# Patient Record
Sex: MALE | Race: ASIAN | Employment: UNEMPLOYED | URBAN - METROPOLITAN AREA
[De-identification: names, ages, dates, MRNs, and addresses within clinical notes are randomized per-mention and may not be internally consistent; named-entity substitution may affect disease eponyms.]

---

## 2022-02-23 ENCOUNTER — OFFICE VISIT (OUTPATIENT)
Dept: INTERNAL MEDICINE CLINIC | Age: 37
End: 2022-02-23

## 2022-02-23 VITALS
SYSTOLIC BLOOD PRESSURE: 140 MMHG | WEIGHT: 229.4 LBS | OXYGEN SATURATION: 95 % | RESPIRATION RATE: 14 BRPM | HEART RATE: 63 BPM | BODY MASS INDEX: 34.77 KG/M2 | TEMPERATURE: 98.1 F | HEIGHT: 68 IN | DIASTOLIC BLOOD PRESSURE: 88 MMHG

## 2022-02-23 DIAGNOSIS — Z11.59 NEED FOR HEPATITIS C SCREENING TEST: ICD-10-CM

## 2022-02-23 DIAGNOSIS — Z78.9 VEGETARIAN DIET: ICD-10-CM

## 2022-02-23 DIAGNOSIS — R03.0 ELEVATED BP WITHOUT DIAGNOSIS OF HYPERTENSION: ICD-10-CM

## 2022-02-23 DIAGNOSIS — E80.6 HYPERBILIRUBINEMIA: ICD-10-CM

## 2022-02-23 DIAGNOSIS — F41.9 ANXIETY: ICD-10-CM

## 2022-02-23 DIAGNOSIS — Z00.00 ROUTINE ADULT HEALTH MAINTENANCE: ICD-10-CM

## 2022-02-23 DIAGNOSIS — E78.5 HYPERLIPIDEMIA, UNSPECIFIED HYPERLIPIDEMIA TYPE: Primary | ICD-10-CM

## 2022-02-23 DIAGNOSIS — J30.9 ALLERGIC RHINITIS, UNSPECIFIED SEASONALITY, UNSPECIFIED TRIGGER: ICD-10-CM

## 2022-02-23 PROCEDURE — 99204 OFFICE O/P NEW MOD 45 MIN: CPT | Performed by: INTERNAL MEDICINE

## 2022-02-23 RX ORDER — ATORVASTATIN CALCIUM 20 MG/1
TABLET, FILM COATED ORAL DAILY
COMMUNITY

## 2022-02-23 RX ORDER — BUSPIRONE HYDROCHLORIDE 10 MG/1
10 TABLET ORAL 2 TIMES DAILY
COMMUNITY
End: 2022-10-14 | Stop reason: SDUPTHER

## 2022-02-23 RX ORDER — CITALOPRAM 20 MG/1
TABLET, FILM COATED ORAL DAILY
COMMUNITY
End: 2022-10-14 | Stop reason: SDUPTHER

## 2022-02-23 RX ORDER — ZINC GLUCONATE 10 MG
LOZENGE ORAL
COMMUNITY

## 2022-02-23 RX ORDER — BISMUTH SUBSALICYLATE 262 MG
1 TABLET,CHEWABLE ORAL DAILY
COMMUNITY

## 2022-02-23 RX ORDER — MELATONIN
DAILY
COMMUNITY

## 2022-02-23 RX ORDER — CETIRIZINE HCL 10 MG
TABLET ORAL
COMMUNITY

## 2022-02-23 NOTE — PROGRESS NOTES
Assessment and Plan     1. Hyperlipidemia, unspecified hyperlipidemia type  Assessment & Plan:   unclear control, continue current medications pending work up below   atorva 20  Orders:  -     CBC W/O DIFF; Future  -     METABOLIC PANEL, COMPREHENSIVE; Future  -     HEMOGLOBIN A1C WITH EAG; Future  -     LIPID PANEL; Future  2. Need for hepatitis C screening test  -     HEPATITIS C AB; Future  3. Vegetarian diet  -     VITAMIN B12; Future  4. Anxiety  Assessment & Plan:   well controlled, continue current medications   buspar 10 BID   Citalopram 20 daily   Started maybe around 3 years   5. Allergic rhinitis, unspecified seasonality, unspecified trigger  Assessment & Plan:   well controlled, continue current medications   Seasonal, uses zyrtec  6. Routine adult health maintenance  Assessment & Plan:  Became a vegetarian a couple months ago  Has been working on diet  Due for covid booster  Encourage healthy habits  7. Elevated BP without diagnosis of hypertension  Assessment & Plan:  Blood pressure elevated on exam today. Recommend ambulatory checks. If persistently 140/90 or greater, advised patient to let me know so we can start medications. Discussed lifestyle changes       Benefits, risks, possible drug interactions, and side effects of all new medications were reviewed with the patient. Pt verbalized understanding. 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 Amaryllis Prader, MD  [facility]  2/23/2022    No future appointments. History of Present Illness   Chief Complaint   estab care    Ramakrishna Velasco is a 29 y.o. male         Review of Systems   Constitutional: Negative for chills and fever. HENT: Negative for hearing loss. Eyes: Negative for blurred vision. Respiratory: Negative for shortness of breath.     Cardiovascular: Negative for chest pain. Gastrointestinal: Negative for abdominal pain, blood in stool, constipation, diarrhea, melena, nausea and vomiting. Genitourinary: Negative for dysuria and hematuria. Musculoskeletal: Negative for joint pain. Skin: Negative for rash. Neurological: Negative for headaches. Past Medical History   No Known Allergies     Current Outpatient Medications   Medication Sig    atorvastatin (LIPITOR) 20 mg tablet Take  by mouth daily.  citalopram (CELEXA) 20 mg tablet Take  by mouth daily.  busPIRone (BUSPAR) 10 mg tablet Take 10 mg by mouth two (2) times a day.  multivitamin (ONE A DAY) tablet Take 1 Tablet by mouth daily.  cholecalciferol (VITAMIN D3) (1000 Units /25 mcg) tablet Take  by mouth daily.  magnesium 250 mg tab Take  by mouth. otc    cetirizine (ZYRTEC) 10 mg tablet Take  by mouth daily as needed. No current facility-administered medications for this visit. Patient Active Problem List   Diagnosis Code    Hyperlipidemia, unspecified hyperlipidemia type E78.5    Anxiety F41.9    Allergic rhinitis J30.9    Routine adult health maintenance Z00.00    Elevated BP without diagnosis of hypertension R03.0     Past Surgical History:   Procedure Laterality Date    HX CHOLECYSTECTOMY        Social History     Tobacco Use    Smoking status: Former Smoker    Smokeless tobacco: Never Used    Tobacco comment: 1-2 packs a week, quit ~2020, 13-14y pack year history   Substance Use Topics    Alcohol use: Never      Family History   Problem Relation Age of Onset    Cancer Father 72        colon    Hypertension Other     Heart Attack Neg Hx     Stroke Neg Hx     Diabetes Neg Hx         Physical Exam   Vitals:       Visit Vitals  BP (!) 140/88   Pulse 63   Temp 98.1 °F (36.7 °C) (Oral)   Resp 14   Ht 5' 8\" (1.727 m)   Wt 229 lb 6.4 oz (104.1 kg)   SpO2 95%   BMI 34.88 kg/m²        Physical Exam  Constitutional:       General: He is not in acute distress. Appearance: He is well-developed. HENT:      Right Ear: External ear normal. There is impacted cerumen. Left Ear: External ear normal. There is impacted cerumen. Eyes:      Extraocular Movements: Extraocular movements intact. Conjunctiva/sclera: Conjunctivae normal.   Cardiovascular:      Rate and Rhythm: Normal rate and regular rhythm. Pulses: Normal pulses. Heart sounds: No murmur heard. No friction rub. No gallop. Pulmonary:      Effort: No respiratory distress. Breath sounds: No wheezing, rhonchi or rales. Abdominal:      General: Bowel sounds are normal. There is no distension. Palpations: Abdomen is soft. There is no hepatomegaly, splenomegaly or mass. Tenderness: There is no abdominal tenderness. There is no guarding. Musculoskeletal:      Cervical back: Neck supple. Right lower leg: No edema. Left lower leg: No edema. Lymphadenopathy:      Cervical: No cervical adenopathy. Skin:     General: Skin is warm. Findings: No rash. Neurological:      Mental Status: He is alert.

## 2022-02-23 NOTE — ASSESSMENT & PLAN NOTE
Blood pressure elevated on exam today. Recommend ambulatory checks. If persistently 140/90 or greater, advised patient to let me know so we can start medications.   Discussed lifestyle changes

## 2022-02-23 NOTE — ASSESSMENT & PLAN NOTE
well controlled, continue current medications   buspar 10 BID   Citalopram 20 daily   Started maybe around 3 years

## 2022-02-23 NOTE — ASSESSMENT & PLAN NOTE
Became a vegetarian a couple months ago  Has been working on diet  Due for covid booster  Encourage healthy habits

## 2022-02-23 NOTE — PATIENT INSTRUCTIONS
Monitor your blood pressure at home 2-3 times a week. If persistently 140/90 or greater (either number), then please let us know.

## 2022-02-24 LAB
ALBUMIN SERPL-MCNC: 4.4 G/DL (ref 3.5–5)
ALBUMIN/GLOB SERPL: 1.6 {RATIO} (ref 1.1–2.2)
ALP SERPL-CCNC: 48 U/L (ref 45–117)
ALT SERPL-CCNC: 50 U/L (ref 12–78)
ANION GAP SERPL CALC-SCNC: 2 MMOL/L (ref 5–15)
AST SERPL-CCNC: 27 U/L (ref 15–37)
BILIRUB SERPL-MCNC: 2.2 MG/DL (ref 0.2–1)
BUN SERPL-MCNC: 15 MG/DL (ref 6–20)
BUN/CREAT SERPL: 20 (ref 12–20)
CALCIUM SERPL-MCNC: 9.1 MG/DL (ref 8.5–10.1)
CHLORIDE SERPL-SCNC: 106 MMOL/L (ref 97–108)
CHOLEST SERPL-MCNC: 222 MG/DL
CO2 SERPL-SCNC: 28 MMOL/L (ref 21–32)
CREAT SERPL-MCNC: 0.76 MG/DL (ref 0.7–1.3)
ERYTHROCYTE [DISTWIDTH] IN BLOOD BY AUTOMATED COUNT: 11.2 % (ref 11.5–14.5)
EST. AVERAGE GLUCOSE BLD GHB EST-MCNC: 103 MG/DL
GLOBULIN SER CALC-MCNC: 2.8 G/DL (ref 2–4)
GLUCOSE SERPL-MCNC: 99 MG/DL (ref 65–100)
HBA1C MFR BLD: 5.2 % (ref 4–5.6)
HCT VFR BLD AUTO: 44.9 % (ref 36.6–50.3)
HCV AB SERPL QL IA: NONREACTIVE
HDLC SERPL-MCNC: 55 MG/DL
HDLC SERPL: 4 {RATIO} (ref 0–5)
HGB BLD-MCNC: 13.8 G/DL (ref 12.1–17)
LDLC SERPL CALC-MCNC: 97.6 MG/DL (ref 0–100)
MCH RBC QN AUTO: 30.3 PG (ref 26–34)
MCHC RBC AUTO-ENTMCNC: 30.7 G/DL (ref 30–36.5)
MCV RBC AUTO: 98.5 FL (ref 80–99)
NRBC # BLD: 0 K/UL (ref 0–0.01)
NRBC BLD-RTO: 0 PER 100 WBC
PLATELET # BLD AUTO: 223 K/UL (ref 150–400)
PMV BLD AUTO: 11.4 FL (ref 8.9–12.9)
POTASSIUM SERPL-SCNC: 4.2 MMOL/L (ref 3.5–5.1)
PROT SERPL-MCNC: 7.2 G/DL (ref 6.4–8.2)
RBC # BLD AUTO: 4.56 M/UL (ref 4.1–5.7)
SODIUM SERPL-SCNC: 136 MMOL/L (ref 136–145)
TRIGL SERPL-MCNC: 347 MG/DL (ref ?–150)
VIT B12 SERPL-MCNC: 1530 PG/ML (ref 193–986)
VLDLC SERPL CALC-MCNC: 69.4 MG/DL
WBC # BLD AUTO: 7.3 K/UL (ref 4.1–11.1)

## 2022-02-25 LAB — BILIRUB DIRECT SERPL-MCNC: 0.4 MG/DL (ref 0–0.2)

## 2022-03-02 NOTE — PROGRESS NOTES
Please call the pt -bilirubin level is higher than normal.  I recommend that he follow-up in a month to get this rechecked and to talk about his cholesterol. His triglyceride level is high.  ======  B12 1530. Hep C negative. Triglyceride 347. LDL 97. A1c normal.  Total bili 2.2. Direct bili 0.4. ALT 50, AST 27. Alk phos 48. CBC normal.    Consider medication for triglyceride.   Unconjugated hyperbilirubinemia -plan for repeat  ALT elevated-repeat

## 2022-03-03 ENCOUNTER — TELEPHONE (OUTPATIENT)
Dept: INTERNAL MEDICINE CLINIC | Age: 37
End: 2022-03-03

## 2022-03-03 NOTE — TELEPHONE ENCOUNTER
----- Message from Carlie Malone MD sent at 0/6/6981  1:50 PM EST -----  Please call the pt -bilirubin level is higher than normal.  I recommend that he follow-up in a month to get this rechecked and to talk about his cholesterol. His triglyceride level is high.  ======  B12 1530. Hep C negative. Triglyceride 347. LDL 97. A1c normal.  Total bili 2.2. Direct bili 0.4. ALT 50, AST 27. Alk phos 48. CBC normal.    Consider medication for triglyceride.   Unconjugated hyperbilirubinemia -plan for repeat  ALT elevated-repeat

## 2022-03-18 PROBLEM — Z00.00 ROUTINE ADULT HEALTH MAINTENANCE: Status: ACTIVE | Noted: 2022-02-23

## 2022-03-19 PROBLEM — J30.9 ALLERGIC RHINITIS: Status: ACTIVE | Noted: 2022-02-23

## 2022-03-19 PROBLEM — R03.0 ELEVATED BP WITHOUT DIAGNOSIS OF HYPERTENSION: Status: ACTIVE | Noted: 2022-02-23

## 2022-03-19 PROBLEM — E78.5 HYPERLIPIDEMIA, UNSPECIFIED HYPERLIPIDEMIA TYPE: Status: ACTIVE | Noted: 2022-02-23

## 2022-03-20 PROBLEM — F41.9 ANXIETY: Status: ACTIVE | Noted: 2022-02-23

## 2022-03-25 PROBLEM — Z00.00 ROUTINE ADULT HEALTH MAINTENANCE: Status: RESOLVED | Noted: 2022-02-23 | Resolved: 2022-03-25

## 2022-04-08 ENCOUNTER — TELEPHONE (OUTPATIENT)
Dept: INTERNAL MEDICINE CLINIC | Age: 37
End: 2022-04-08

## 2022-04-08 NOTE — TELEPHONE ENCOUNTER
----- Message from Boone Severs sent at 4/8/2022  1:32 PM EDT -----  Subject: Message to Provider    QUESTIONS  Information for Provider? pt. wanted to inform of appointment cancellation   reason being due to concern of another covid wave happening and wanting to   hold off on non-essential appointments at this time  ---------------------------------------------------------------------------  --------------  [address]  What is the best way for the office to contact you? OK to leave message on   voicemail  Preferred Call Back Phone Number? [phone]  ---------------------------------------------------------------------------  --------------  SCRIPT ANSWERS  Relationship to Patient?  Self

## 2022-10-14 RX ORDER — BUSPIRONE HYDROCHLORIDE 10 MG/1
10 TABLET ORAL 2 TIMES DAILY
Qty: 60 TABLET | Refills: 0 | Status: SHIPPED | OUTPATIENT
Start: 2022-10-14 | End: 2022-11-10 | Stop reason: SDUPTHER

## 2022-10-14 RX ORDER — CITALOPRAM 20 MG/1
20 TABLET, FILM COATED ORAL DAILY
Qty: 30 TABLET | Refills: 0 | Status: SHIPPED | OUTPATIENT
Start: 2022-10-14

## 2022-11-10 RX ORDER — BUSPIRONE HYDROCHLORIDE 10 MG/1
10 TABLET ORAL 2 TIMES DAILY
Qty: 180 TABLET | Refills: 1 | Status: SHIPPED | OUTPATIENT
Start: 2022-11-10

## 2022-11-18 NOTE — TELEPHONE ENCOUNTER
Patient called to inform us Pharmacy would not refill without appointment - we did schedule for first available 12/28 at 1045 AM

## 2022-11-19 RX ORDER — ATORVASTATIN CALCIUM 20 MG/1
20 TABLET, FILM COATED ORAL DAILY
Qty: 90 TABLET | Refills: 1 | Status: SHIPPED | OUTPATIENT
Start: 2022-11-19

## 2022-12-09 RX ORDER — CITALOPRAM 20 MG/1
20 TABLET, FILM COATED ORAL DAILY
Qty: 30 TABLET | Refills: 0 | Status: SHIPPED | OUTPATIENT
Start: 2022-12-09

## 2022-12-12 RX ORDER — CITALOPRAM 20 MG/1
20 TABLET, FILM COATED ORAL DAILY
Qty: 30 TABLET | Refills: 0 | Status: SHIPPED | OUTPATIENT
Start: 2022-12-12

## 2022-12-28 ENCOUNTER — OFFICE VISIT (OUTPATIENT)
Dept: INTERNAL MEDICINE CLINIC | Age: 37
End: 2022-12-28
Payer: COMMERCIAL

## 2022-12-28 VITALS
HEIGHT: 68 IN | BODY MASS INDEX: 34.07 KG/M2 | HEART RATE: 67 BPM | RESPIRATION RATE: 14 BRPM | SYSTOLIC BLOOD PRESSURE: 119 MMHG | WEIGHT: 224.8 LBS | OXYGEN SATURATION: 98 % | DIASTOLIC BLOOD PRESSURE: 77 MMHG | TEMPERATURE: 98 F

## 2022-12-28 DIAGNOSIS — E78.5 HYPERLIPIDEMIA, UNSPECIFIED HYPERLIPIDEMIA TYPE: ICD-10-CM

## 2022-12-28 DIAGNOSIS — R79.89 ELEVATED LFTS: Primary | ICD-10-CM

## 2022-12-28 DIAGNOSIS — E80.6 HYPERBILIRUBINEMIA: ICD-10-CM

## 2022-12-28 DIAGNOSIS — F41.9 ANXIETY: ICD-10-CM

## 2022-12-28 PROCEDURE — 99214 OFFICE O/P EST MOD 30 MIN: CPT | Performed by: INTERNAL MEDICINE

## 2022-12-28 RX ORDER — CITALOPRAM 20 MG/1
20 TABLET, FILM COATED ORAL DAILY
Qty: 90 TABLET | Refills: 3 | Status: SHIPPED | OUTPATIENT
Start: 2022-12-28

## 2022-12-28 NOTE — ASSESSMENT & PLAN NOTE
Labs in February noted total bilirubin 2.2, direct bili 0.5, ALT 50, AST 27, alk phos 48  Recommend repeat with LFTs work-up

## 2022-12-28 NOTE — ASSESSMENT & PLAN NOTE
borderline controlled, continue current medications   BuSpar 10 twice a day, Celexa 20  Borderline controlled, patient does not want to make any changes today

## 2022-12-28 NOTE — PROGRESS NOTES
Note   Chief Complaint   Follow up    Carole Sumner is a 40 y.o. male     1. Elevated LFTs  Assessment & Plan:   Labs in February noted total bilirubin 2.2, direct bili 0.5, ALT 50, AST 27, alk phos 48  Recommend repeat with LFTs work-up  Orders:  -     HEPATITIS C AB; Future  -     FERRITIN; Future  -     IRON PROFILE; Future  -     ALPHA-1-ANTITRYPSIN, TOTAL; Future  -     HBV CORE AB, IGG/IGM; Future  -     HEP B SURFACE AG; Future  2. Hyperbilirubinemia  -     METABOLIC PANEL, COMPREHENSIVE; Future  -     BILIRUBIN, DIRECT; Future  -     LD; Future  -     HAPTOGLOBIN; Future  -     PATHOLOGIST REVIEW SMEARS; Future  3. Hyperlipidemia, unspecified hyperlipidemia type  Assessment & Plan:  Last LDL 97, triglyceride 347  Repeat. Continue atorvastatin 20 pending labs  Orders:  -     LIPID PANEL; Future  4. Anxiety  Assessment & Plan:   borderline controlled, continue current medications   BuSpar 10 twice a day, Celexa 20  Borderline controlled, patient does not want to make any changes today  Orders:  -     citalopram (CELEXA) 20 mg tablet; Take 1 Tablet by mouth daily. Take  by mouth daily. Indications: anxiety, Normal, Disp-90 Tablet, R-3       Benefits, risks, possible drug interactions, and side effects of all new medications were reviewed with the patient. Pt verbalized understanding. Return to clinic: Pending labs, 1 year follow-up for physical or earlier if needed  , 2 kids 7yo, 2yo  Stay at home [place]    An electronic signature was used to authenticate this note. Zuleima Palma MD  [facility]  12/28/2022    No future appointments.        Objective   Vitals:       Visit Vitals  /77 (BP 1 Location: Left upper arm, BP Patient Position: Sitting, BP Cuff Size: Small adult)   Pulse 67   Temp 98 °F (36.7 °C) (Oral)   Resp 14   Ht 5' 8\" (1.727 m)   Wt 224 lb 12.8 oz (102 kg)   SpO2 98%   BMI 34.18 kg/m²        Physical Exam  Constitutional:       Appearance: Normal appearance. He is not ill-appearing. Cardiovascular:      Rate and Rhythm: Normal rate and regular rhythm. Heart sounds: No murmur heard. No friction rub. No gallop. Pulmonary:      Effort: No respiratory distress. Breath sounds: Normal breath sounds. No wheezing, rhonchi or rales. Neurological:      Mental Status: He is alert. Current Outpatient Medications   Medication Sig    citalopram (CELEXA) 20 mg tablet Take 1 Tablet by mouth daily. Take  by mouth daily. Indications: anxiety    atorvastatin (LIPITOR) 20 mg tablet Take 1 Tablet by mouth daily. Indications: high cholesterol    busPIRone (BUSPAR) 10 mg tablet Take 1 Tablet by mouth two (2) times a day. multivitamin (ONE A DAY) tablet Take 1 Tablet by mouth daily. cholecalciferol (VITAMIN D3) (1000 Units /25 mcg) tablet Take  by mouth daily. magnesium 250 mg tab Take  by mouth. otc    cetirizine (ZYRTEC) 10 mg tablet Take  by mouth daily as needed. No current facility-administered medications for this visit.

## 2022-12-29 LAB
ALBUMIN SERPL-MCNC: 4.6 G/DL (ref 3.5–5)
ALBUMIN/GLOB SERPL: 1.5 {RATIO} (ref 1.1–2.2)
ALP SERPL-CCNC: 53 U/L (ref 45–117)
ALT SERPL-CCNC: 54 U/L (ref 12–78)
ANION GAP SERPL CALC-SCNC: 7 MMOL/L (ref 5–15)
AST SERPL-CCNC: 37 U/L (ref 15–37)
BILIRUB DIRECT SERPL-MCNC: 0.5 MG/DL (ref 0–0.2)
BILIRUB SERPL-MCNC: 2.8 MG/DL (ref 0.2–1)
BUN SERPL-MCNC: 18 MG/DL (ref 6–20)
BUN/CREAT SERPL: 15 (ref 12–20)
CALCIUM SERPL-MCNC: 9.7 MG/DL (ref 8.5–10.1)
CHLORIDE SERPL-SCNC: 102 MMOL/L (ref 97–108)
CHOLEST SERPL-MCNC: 243 MG/DL
CO2 SERPL-SCNC: 28 MMOL/L (ref 21–32)
CREAT SERPL-MCNC: 1.2 MG/DL (ref 0.7–1.3)
FERRITIN SERPL-MCNC: 470 NG/ML (ref 26–388)
GLOBULIN SER CALC-MCNC: 3.1 G/DL (ref 2–4)
GLUCOSE SERPL-MCNC: 96 MG/DL (ref 65–100)
HAPTOGLOB SERPL-MCNC: 90 MG/DL (ref 30–200)
HBV SURFACE AG SER QL: <0.1 INDEX
HBV SURFACE AG SER QL: NEGATIVE
HCV AB SERPL QL IA: NONREACTIVE
HDLC SERPL-MCNC: 54 MG/DL
HDLC SERPL: 4.5 {RATIO} (ref 0–5)
IRON SATN MFR SERPL: 24 % (ref 20–50)
IRON SERPL-MCNC: 85 UG/DL (ref 35–150)
LDH SERPL L TO P-CCNC: 160 U/L (ref 85–241)
LDLC SERPL CALC-MCNC: 128.2 MG/DL (ref 0–100)
PERIPHERAL SMEAR,PSM: NORMAL
POTASSIUM SERPL-SCNC: 4.1 MMOL/L (ref 3.5–5.1)
PROT SERPL-MCNC: 7.7 G/DL (ref 6.4–8.2)
SODIUM SERPL-SCNC: 137 MMOL/L (ref 136–145)
TIBC SERPL-MCNC: 354 UG/DL (ref 250–450)
TRIGL SERPL-MCNC: 304 MG/DL (ref ?–150)
VLDLC SERPL CALC-MCNC: 60.8 MG/DL

## 2022-12-30 LAB — A1AT SERPL-MCNC: 108 MG/DL (ref 95–164)

## 2023-01-04 ENCOUNTER — TELEPHONE (OUTPATIENT)
Dept: INTERNAL MEDICINE CLINIC | Age: 38
End: 2023-01-04

## 2023-01-04 DIAGNOSIS — R79.89 ELEVATED LFTS: Primary | ICD-10-CM

## 2023-01-04 NOTE — PROGRESS NOTES
Please call the pt - his liver enzymes and bilirubin is still elevated but the tests looking for a possible reason why are negative. I recommend getting an ultrasound to look at his liver. He can call central scheduling to schedule.    ===  Hep B negative. Alpha-1 antitrypsin negative. Iron profile normal.  Ferritin 470. Hep C negative. Haptoglobin normal.  LDH normal.  . Triglyceride 304. .  T bili 2.8, mostly unconjugated.   ALT 54, AST 37

## 2023-01-04 NOTE — TELEPHONE ENCOUNTER
----- Message from Erica Corbin MD sent at 5/2/7166 12:42 PM EST -----  Please call the pt - his liver enzymes and bilirubin is still elevated but the tests looking for a possible reason why are negative. I recommend getting an ultrasound to look at his liver. He can call central scheduling to schedule.    ===  Hep B negative. Alpha-1 antitrypsin negative. Iron profile normal.  Ferritin 470. Hep C negative. Haptoglobin normal.  LDH normal.  . Triglyceride 304. .  T bili 2.8, mostly unconjugated.   ALT 54, AST 37

## 2023-01-11 ENCOUNTER — HOSPITAL ENCOUNTER (OUTPATIENT)
Dept: ULTRASOUND IMAGING | Age: 38
Discharge: HOME | End: 2023-01-11
Attending: INTERNAL MEDICINE
Payer: COMMERCIAL

## 2023-01-11 DIAGNOSIS — R79.89 ELEVATED LFTS: ICD-10-CM

## 2023-01-11 PROCEDURE — 76700 US EXAM ABDOM COMPLETE: CPT

## 2023-01-16 PROBLEM — K76.0 HEPATIC STEATOSIS: Status: ACTIVE | Noted: 2023-01-16

## 2023-04-21 RX ORDER — BUSPIRONE HYDROCHLORIDE 10 MG/1
TABLET ORAL
Qty: 180 TABLET | Refills: 3 | Status: SHIPPED | OUTPATIENT
Start: 2023-04-21

## 2023-05-17 RX ORDER — ATORVASTATIN CALCIUM 20 MG/1
TABLET, FILM COATED ORAL
Qty: 90 TABLET | Refills: 3 | Status: SHIPPED | OUTPATIENT
Start: 2023-05-17

## 2023-12-26 RX ORDER — CITALOPRAM 20 MG/1
TABLET ORAL
Qty: 90 TABLET | Refills: 2 | OUTPATIENT
Start: 2023-12-26